# Patient Record
Sex: MALE | Race: BLACK OR AFRICAN AMERICAN | NOT HISPANIC OR LATINO | Employment: FULL TIME | URBAN - METROPOLITAN AREA
[De-identification: names, ages, dates, MRNs, and addresses within clinical notes are randomized per-mention and may not be internally consistent; named-entity substitution may affect disease eponyms.]

---

## 2018-09-18 ENCOUNTER — OFFICE VISIT (OUTPATIENT)
Dept: LAB | Facility: HOSPITAL | Age: 38
End: 2018-09-18
Attending: INTERNAL MEDICINE
Payer: COMMERCIAL

## 2018-09-18 ENCOUNTER — TRANSCRIBE ORDERS (OUTPATIENT)
Dept: ADMINISTRATIVE | Facility: HOSPITAL | Age: 38
End: 2018-09-18

## 2018-09-18 DIAGNOSIS — M79.602 LEFT ARM PAIN: ICD-10-CM

## 2018-09-18 DIAGNOSIS — M79.602 LEFT ARM PAIN: Primary | ICD-10-CM

## 2018-09-18 LAB
ATRIAL RATE: 53 BPM
P AXIS: 59 DEGREES
PR INTERVAL: 182 MS
QRS AXIS: 41 DEGREES
QRSD INTERVAL: 100 MS
QT INTERVAL: 416 MS
QTC INTERVAL: 390 MS
T WAVE AXIS: 21 DEGREES
VENTRICULAR RATE: 53 BPM

## 2018-09-18 PROCEDURE — 93010 ELECTROCARDIOGRAM REPORT: CPT | Performed by: INTERNAL MEDICINE

## 2018-09-18 PROCEDURE — 93005 ELECTROCARDIOGRAM TRACING: CPT

## 2019-01-14 ENCOUNTER — OFFICE VISIT (OUTPATIENT)
Dept: DERMATOLOGY | Facility: CLINIC | Age: 39
End: 2019-01-14
Payer: COMMERCIAL

## 2019-01-14 DIAGNOSIS — Z13.89 SCREENING FOR SKIN CONDITION: ICD-10-CM

## 2019-01-14 DIAGNOSIS — L80 VITILIGO: Primary | ICD-10-CM

## 2019-01-14 PROCEDURE — 99203 OFFICE O/P NEW LOW 30 MIN: CPT | Performed by: DERMATOLOGY

## 2019-01-14 RX ORDER — MELATONIN
1000 DAILY
COMMUNITY

## 2019-01-14 RX ORDER — TACROLIMUS 1 MG/G
OINTMENT TOPICAL 2 TIMES DAILY
Qty: 30 G | Refills: 1 | Status: SHIPPED | OUTPATIENT
Start: 2019-01-14 | End: 2021-09-09 | Stop reason: ALTCHOICE

## 2019-01-14 RX ORDER — DIPHENOXYLATE HYDROCHLORIDE AND ATROPINE SULFATE 2.5; .025 MG/1; MG/1
1 TABLET ORAL DAILY
COMMUNITY

## 2019-01-14 NOTE — PROGRESS NOTES
[address]  [address]  [place]  [phone]  [phone]     MRN: [id number] : 1980  Encounter: [id number]  Patient Information: Medardo Krishnan  Chief complaint:  Discoloration of lips    History of present illness:  52-year-old male presents for concerns regarding loss of pigmentation on his lip that has been going on for 6 months no previous history of loss pigmentation noted  No past medical history on file  No past surgical history on file  Social History   History   Alcohol Use No     History   Drug Use No     History   Smoking Status    Never Smoker   Smokeless Tobacco    Never Used     No family history on file  Meds/Allergies   No Known Allergies    Meds:  Prior to Admission medications    Medication Sig Start Date End Date Taking?  Authorizing Provider   cholecalciferol (VITAMIN D3) 1,000 units tablet Take 1,000 Units by mouth daily   Yes Historical Provider, MD   multivitamin (THERAGRAN) TABS Take 1 tablet by mouth daily   Yes Historical Provider, MD       Subjective:     Review of Systems:    General: negative for - chills, fatigue, fever,  weight gain or weight loss  Psychological: negative for - anxiety, behavioral disorder, concentration difficulties, decreased libido, depression, irritability, memory difficulties, mood swings, sleep disturbances or suicidal ideation  ENT: negative for - hearing difficulties , nasal congestion, nasal discharge, oral lesions, sinus pain, sneezing, sore throat  Allergy and Immunology: negative for - hives, insect bite sensitivity,  Hematological and Lymphatic: negative for - bleeding problems, blood clots,bruising, swollen lymph nodes  Endocrine: negative for - hair pattern changes, hot flashes, malaise/lethargy, mood swings, palpitations, polydipsia/polyuria, skin changes, temperature intolerance or unexpected weight change  Respiratory: negative for - cough, hemoptysis, orthopnea, shortness of breath, or wheezing  Cardiovascular: negative for - chest pain, dyspnea on exertion, edema,  Gastrointestinal: negative for - abdominal pain, nausea/vomiting  Genito-Urinary: negative for - dysuria, incontinence, irregular/heavy menses or urinary frequency/urgency  Musculoskeletal: negative for - gait disturbance, joint pain, joint stiffness, joint swelling, muscle pain, muscular weakness  Dermatological:  As in HPI  Neurological: negative for confusion, dizziness, headaches, impaired coordination/balance, memory loss, numbness/tingling, seizures, speech problems, tremors or weakness       Objective: There were no vitals taken for this visit  Physical Exam:    General Appearance:    Alert, cooperative, no distress   Head:    Normocephalic, without obvious abnormality, atraumatic           Skin:   A full skin exam was performed including scalp, head scalp, eyes, ears, nose, lips, neck, chest, axilla, abdomen, back, buttocks, bilateral upper extremities, bilateral lower extremities, hands, feet, fingers, toes, fingernails, and toenails hypopigmentation the lips noted no other evidence of any of the loss of pigmentation noted on the entire body           Assessment:     1  Vitiligo     2  Screening for skin condition           Plan:   Vitiligo we discussed the concept of this autoimmune process will go ahead and treat with tacrolimus to see if we can get the pigmentation to return  We also hopefully no new spots well-developed will re-evaluate again in about 4 months  Nothing else of concern noted on exam    Lou Hankins MD  2/74/1643,9:22 AM    Portions of the record may have been created with voice recognition software   Occasional wrong word or "sound a like" substitutions may have occurred due to the inherent limitations of voice recognition software   Read the chart carefully and recognize, using context, where substitutions have occurred

## 2019-01-14 NOTE — PATIENT INSTRUCTIONS
Vitiligo we discussed the concept of this autoimmune process will go ahead and treat with tacrolimus to see if we can get the pigmentation to return    We also hopefully no new spots well-developed will re-evaluate again in about 4 months  Nothing else of concern noted on exam

## 2019-01-16 ENCOUNTER — TELEPHONE (OUTPATIENT)
Dept: DERMATOLOGY | Facility: CLINIC | Age: 39
End: 2019-01-16

## 2021-08-17 ENCOUNTER — HOSPITAL ENCOUNTER (OUTPATIENT)
Dept: RADIOLOGY | Facility: HOSPITAL | Age: 41
Discharge: HOME | End: 2021-08-17
Attending: INTERNAL MEDICINE
Payer: COMMERCIAL

## 2021-08-17 DIAGNOSIS — M79.672 LEFT FOOT PAIN: ICD-10-CM

## 2021-08-17 PROCEDURE — 73630 X-RAY EXAM OF FOOT: CPT

## 2021-09-09 VITALS
DIASTOLIC BLOOD PRESSURE: 84 MMHG | BODY MASS INDEX: 25.67 KG/M2 | WEIGHT: 200 LBS | HEIGHT: 74 IN | HEART RATE: 64 BPM | RESPIRATION RATE: 18 BRPM | SYSTOLIC BLOOD PRESSURE: 131 MMHG

## 2021-09-09 DIAGNOSIS — R29.898 HYPEREXTENSION OF GREAT TOE: Primary | ICD-10-CM

## 2021-09-09 PROCEDURE — 99203 OFFICE O/P NEW LOW 30 MIN: CPT | Performed by: ORTHOPAEDIC SURGERY

## 2021-09-09 NOTE — PROGRESS NOTES
Patient Name:  Boston Thompson  MRN:  [id number]    [address] [place] [address] [place]     1  Hyperextension of great toe  -     Durable Medical Equipment      37year old male with Left foot hyperextension injury, possible plantar plate injury with most pain and tenderness over 1st MTP joint  X-rays reviewed in office today with patient  In light of patients traumatic foot pain and negtive x-rays, advised we can perform weightbearing x-rays; patient denied at this time as he received a bill in the mail from last images performed  Placed patient in post op, hard sole shoe to prevent dorsiflexion of toes  Advised patient to apply ice and perform gentle ROM exercises of Left ankle and toes  Can continue OTC oral analgesics as needed for pain relief  Continue use of hard sole shoe for 4 weeks until follow up in office in 4 weeks  Verbalized understanding of the above  Chief Complaint     Left foot pain    History of the Present Illness     Boston Thompson is a 37 y o  male with Left foot pain for one month after injury sliding during a baseball game  Patient reports he jammed Left foot into ground before base, had immediate pain in Left foot, but was able to ambulate and play two more innings of the game  Patient did seek out medical care with PCP whom ordered x-rays which were interpreted with no acute osseous abnormality  He reports he has been icing and elevating and administering OTC oral analgesics as needed for pain without resolution  He reports pain with toe extension and tight shoes  Patient  of a band and reports pain and difficulty with consistent ambulation throughout the day  Denies numbness or tingling Left lower extremity  Review of Systems     Review of Systems   Constitutional: Negative for chills and fever  HENT: Negative for ear pain and sore throat  Eyes: Negative for pain and visual disturbance  Respiratory: Negative for cough and shortness of breath      Cardiovascular: Negative for chest pain and palpitations  Gastrointestinal: Negative for abdominal pain and vomiting  Genitourinary: Negative for dysuria and hematuria  Musculoskeletal: Negative for arthralgias, back pain and gait problem  Skin: Negative for color change and rash  Neurological: Negative for seizures and syncope  All other systems reviewed and are negative  Physical Exam     /84   Pulse 64   Resp 18   Ht 6' 2" (1 88 m)   Wt 90 7 kg (200 lb)   BMI 25 68 kg/m²     Left foot: Minimal swelling noted over dorsum of 2nd-4th MTP toes without erythema or rash  Tenderness to palpation over MTP joints of 1st-4th toes and along dorsum of Left foot; greatest discomfort at dorsum and medial aspect of 1st MTP joint  Tenderness over plantar aspect of 1st MTP joint appreciated  Tenderness with passive and active extension of 1st through 4th toes  No tenderness over plantar aspect of foot, no tenderness at Lisfranc joint  No pain with midfoot compression  Full active range of motion of ankle without pain  Sensation intact L2-S1 dermatomes  +2 DP pulse palpated  Eyes:  Anicteric sclerae  Neck:  Supple  Lungs:  Normal respiratory effort  Cardiovascular:  Capillary refill is less than 2 seconds  Skin:  Intact without erythema  Neurologic:  Sensation grossly intact to light touch  Psychiatric:  Mood and affect are appropriate  Data Review     I have personally reviewed pertinent films in PACS, and my interpretation follows:    X-rays taken of Left foot demonstrate no acute fracture, dislocation, significant degenerative change  History reviewed  No pertinent past medical history  History reviewed  No pertinent surgical history      No Known Allergies    Current Outpatient Medications on File Prior to Visit   Medication Sig Dispense Refill    cholecalciferol (VITAMIN D3) 1,000 units tablet Take 1,000 Units by mouth daily      multivitamin (THERAGRAN) TABS Take 1 tablet by mouth daily      [DISCONTINUED] tacrolimus (PROTOPIC) 0 1 % ointment Apply topically 2 (two) times a day (Patient not taking: Reported on 9/9/2021) 30 g 1     No current facility-administered medications on file prior to visit         Social History     Tobacco Use    Smoking status: Never Smoker    Smokeless tobacco: Never Used   Vaping Use    Vaping Use: Never used   Substance Use Topics    Alcohol use: No    Drug use: No       Family History   Problem Relation Age of Onset    No Known Problems Mother     No Known Problems Father              Procedures Performed     Procedures  None      Elif Hinds DO

## 2023-06-21 ENCOUNTER — APPOINTMENT (EMERGENCY)
Dept: RADIOLOGY | Facility: HOSPITAL | Age: 43
End: 2023-06-21
Payer: COMMERCIAL

## 2023-06-21 ENCOUNTER — HOSPITAL ENCOUNTER (EMERGENCY)
Facility: HOSPITAL | Age: 43
Discharge: HOME | End: 2023-06-21
Attending: EMERGENCY MEDICINE
Payer: COMMERCIAL

## 2023-06-21 VITALS
RESPIRATION RATE: 20 BRPM | SYSTOLIC BLOOD PRESSURE: 140 MMHG | OXYGEN SATURATION: 100 % | WEIGHT: 209.22 LBS | TEMPERATURE: 97.7 F | BODY MASS INDEX: 26.85 KG/M2 | HEART RATE: 58 BPM | HEIGHT: 74 IN | DIASTOLIC BLOOD PRESSURE: 92 MMHG

## 2023-06-21 DIAGNOSIS — R07.9 CHEST PAIN: Primary | ICD-10-CM

## 2023-06-21 LAB
ALBUMIN SERPL BCP-MCNC: 4.6 G/DL (ref 3.5–5)
ALP SERPL-CCNC: 49 U/L (ref 34–104)
ALT SERPL W P-5'-P-CCNC: 32 U/L (ref 7–52)
ANION GAP SERPL CALCULATED.3IONS-SCNC: 4 MMOL/L
AST SERPL W P-5'-P-CCNC: 22 U/L (ref 13–39)
ATRIAL RATE: 54 BPM
BASOPHILS # BLD AUTO: 0.02 THOUSANDS/ÂΜL (ref 0–0.1)
BASOPHILS NFR BLD AUTO: 1 % (ref 0–1)
BILIRUB SERPL-MCNC: 1.04 MG/DL (ref 0.2–1)
BUN SERPL-MCNC: 17 MG/DL (ref 5–25)
CALCIUM SERPL-MCNC: 9.3 MG/DL (ref 8.4–10.2)
CARDIAC TROPONIN I PNL SERPL HS: 3 NG/L
CHLORIDE SERPL-SCNC: 107 MMOL/L (ref 96–108)
CO2 SERPL-SCNC: 30 MMOL/L (ref 21–32)
CREAT SERPL-MCNC: 0.83 MG/DL (ref 0.6–1.3)
EOSINOPHIL # BLD AUTO: 0.17 THOUSAND/ÂΜL (ref 0–0.61)
EOSINOPHIL NFR BLD AUTO: 5 % (ref 0–6)
ERYTHROCYTE [DISTWIDTH] IN BLOOD BY AUTOMATED COUNT: 12.4 % (ref 11.6–15.1)
GFR SERPL CREATININE-BSD FRML MDRD: 108 ML/MIN/1.73SQ M
GLUCOSE SERPL-MCNC: 86 MG/DL (ref 65–140)
HCT VFR BLD AUTO: 44.7 % (ref 36.5–49.3)
HGB BLD-MCNC: 14.1 G/DL (ref 12–17)
IMM GRANULOCYTES # BLD AUTO: 0 THOUSAND/UL (ref 0–0.2)
IMM GRANULOCYTES NFR BLD AUTO: 0 % (ref 0–2)
LYMPHOCYTES # BLD AUTO: 1.44 THOUSANDS/ÂΜL (ref 0.6–4.47)
LYMPHOCYTES NFR BLD AUTO: 43 % (ref 14–44)
MCH RBC QN AUTO: 29.3 PG (ref 26.8–34.3)
MCHC RBC AUTO-ENTMCNC: 31.5 G/DL (ref 31.4–37.4)
MCV RBC AUTO: 93 FL (ref 82–98)
MONOCYTES # BLD AUTO: 0.3 THOUSAND/ÂΜL (ref 0.17–1.22)
MONOCYTES NFR BLD AUTO: 9 % (ref 4–12)
NEUTROPHILS # BLD AUTO: 1.46 THOUSANDS/ÂΜL (ref 1.85–7.62)
NEUTS SEG NFR BLD AUTO: 42 % (ref 43–75)
NRBC BLD AUTO-RTO: 0 /100 WBCS
P AXIS: 49 DEGREES
PLATELET # BLD AUTO: 190 THOUSANDS/UL (ref 149–390)
PMV BLD AUTO: 10.9 FL (ref 8.9–12.7)
POTASSIUM SERPL-SCNC: 4.1 MMOL/L (ref 3.5–5.3)
PR INTERVAL: 176 MS
PROT SERPL-MCNC: 7.6 G/DL (ref 6.4–8.4)
QRS AXIS: 44 DEGREES
QRSD INTERVAL: 102 MS
QT INTERVAL: 422 MS
QTC INTERVAL: 400 MS
RBC # BLD AUTO: 4.82 MILLION/UL (ref 3.88–5.62)
SODIUM SERPL-SCNC: 141 MMOL/L (ref 135–147)
T WAVE AXIS: 17 DEGREES
VENTRICULAR RATE: 54 BPM
WBC # BLD AUTO: 3.39 THOUSAND/UL (ref 4.31–10.16)

## 2023-06-21 PROCEDURE — 93005 ELECTROCARDIOGRAM TRACING: CPT

## 2023-06-21 PROCEDURE — 93010 ELECTROCARDIOGRAM REPORT: CPT | Performed by: INTERNAL MEDICINE

## 2023-06-21 PROCEDURE — 80053 COMPREHEN METABOLIC PANEL: CPT | Performed by: PHYSICIAN ASSISTANT

## 2023-06-21 PROCEDURE — 36415 COLL VENOUS BLD VENIPUNCTURE: CPT | Performed by: PHYSICIAN ASSISTANT

## 2023-06-21 PROCEDURE — 84484 ASSAY OF TROPONIN QUANT: CPT | Performed by: PHYSICIAN ASSISTANT

## 2023-06-21 PROCEDURE — 85025 COMPLETE CBC W/AUTO DIFF WBC: CPT | Performed by: PHYSICIAN ASSISTANT

## 2023-06-21 PROCEDURE — 71046 X-RAY EXAM CHEST 2 VIEWS: CPT

## 2023-06-21 NOTE — ED PROVIDER NOTES
History  Chief Complaint   Patient presents with   • Chest Pain     Pt reports sharp anterior CP that is intermittent  Usually at rest and when he is lying to right or left  Reports shoulder pain today     36yo male with no significant past medical history presenting for evaluation of chest pain x 2-3 weeks  He reports localized pain in the right upper and left lower chest for the past several weeks  He describes the pain as sharp and rates his pain as a 7/10 in severity  He initially thought he may have pulled a muscle at [employer] but he denies any direct trauma to the chest  He denies any alleviating or provoking factors  Specifically, he denies any pleuritic, postprandial, or exertional pain  He has tried ibuprofen with some improvement  He is otherwise asymptomatic and denies any fevers, chills, cough, dizziness, syncope, nausea, vomiting, abdominal pain, shortness of breath, paresthesias, leg swelling  No personal or family history of heart disease  He is a non-smoker  History provided by:  Patient   used: No    Chest Pain  Pain location:  R chest and L chest  Pain quality: sharp    Pain radiates to:  Does not radiate  Pain severity:  Moderate  Onset quality:  Gradual  Duration:  3 weeks  Timing:  Constant  Progression:  Unchanged  Chronicity:  New  Relieved by:  Nothing  Worsened by:  Nothing tried  Associated symptoms: no abdominal pain, no altered mental status, no cough, no dizziness, no fever, no lower extremity edema, no nausea, no near-syncope, no numbness, no shortness of breath, no syncope and not vomiting        Prior to Admission Medications   Prescriptions Last Dose Informant Patient Reported? Taking? cholecalciferol (VITAMIN D3) 1,000 units tablet  Self Yes No   Sig: Take 1,000 Units by mouth daily   multivitamin (THERAGRAN) TABS  Self Yes No   Sig: Take 1 tablet by mouth daily      Facility-Administered Medications: None       History reviewed   No pertinent past medical history  History reviewed  No pertinent surgical history  Family History   Problem Relation Age of Onset   • No Known Problems Mother    • No Known Problems Father      I have reviewed and agree with the history as documented  E-Cigarette/Vaping   • E-Cigarette Use Never User      E-Cigarette/Vaping Substances   • Nicotine No    • THC No    • CBD No    • Flavoring No    • Other No    • Unknown No      Social History     Tobacco Use   • Smoking status: Never   • Smokeless tobacco: Never   Vaping Use   • Vaping Use: Never used   Substance Use Topics   • Alcohol use: Yes     Comment: socially   • Drug use: No       Review of Systems   Constitutional: Negative for chills and fever  HENT: Negative for drooling and voice change  Eyes: Negative for discharge and redness  Respiratory: Negative for cough, shortness of breath and stridor  Cardiovascular: Positive for chest pain  Negative for leg swelling, syncope and near-syncope  Gastrointestinal: Negative for abdominal pain, nausea and vomiting  Musculoskeletal: Negative for neck pain and neck stiffness  Skin: Negative for color change and rash  Neurological: Negative for dizziness, seizures, syncope and numbness  Psychiatric/Behavioral: Negative for confusion  The patient is not nervous/anxious  All other systems reviewed and are negative  Physical Exam  Physical Exam  Vitals and nursing note reviewed  Constitutional:       General: He is not in acute distress  Appearance: He is well-developed  He is not diaphoretic  HENT:      Head: Normocephalic and atraumatic  Right Ear: External ear normal       Left Ear: External ear normal    Eyes:      General: No scleral icterus  Right eye: No discharge  Left eye: No discharge  Conjunctiva/sclera: Conjunctivae normal    Cardiovascular:      Rate and Rhythm: Normal rate and regular rhythm  Heart sounds: Normal heart sounds   No murmur heard   Pulmonary:      Effort: Pulmonary effort is normal  No respiratory distress  Breath sounds: Normal breath sounds  No stridor  No wheezing or rales  Abdominal:      General: Bowel sounds are normal  There is no distension  Palpations: Abdomen is soft  Tenderness: There is no abdominal tenderness  There is no guarding  Musculoskeletal:         General: No deformity  Normal range of motion  Cervical back: Normal range of motion and neck supple  Right lower leg: No edema  Left lower leg: No edema  Skin:     General: Skin is warm and dry  Neurological:      Mental Status: He is alert  He is not disoriented  GCS: GCS eye subscore is 4  GCS verbal subscore is 5  GCS motor subscore is 6     Psychiatric:         Behavior: Behavior normal          Vital Signs  ED Triage Vitals [06/21/23 0955]   Temperature Pulse Respirations Blood Pressure SpO2   97 7 °F (36 5 °C) 59 18 159/91 99 %      Temp src Heart Rate Source Patient Position - Orthostatic VS BP Location FiO2 (%)   -- Monitor Lying Right arm --      Pain Score       7           Vitals:    06/21/23 0955 06/21/23 1000 06/21/23 1100   BP: 159/91 134/90 140/92   Pulse: 59 65 58   Patient Position - Orthostatic VS: Lying Lying          Visual Acuity      ED Medications  Medications - No data to display    Diagnostic Studies  Results Reviewed     Procedure Component Value Units Date/Time    Comprehensive metabolic panel [[id number]]  (Abnormal) Collected: 06/21/23 1007    Lab Status: Final result Specimen: Blood from Arm, Left Updated: 06/21/23 1040     Sodium 141 mmol/L      Potassium 4 1 mmol/L      Chloride 107 mmol/L      CO2 30 mmol/L      ANION GAP 4 mmol/L      BUN 17 mg/dL      Creatinine 0 83 mg/dL      Glucose 86 mg/dL      Calcium 9 3 mg/dL      AST 22 U/L      ALT 32 U/L      Alkaline Phosphatase 49 U/L      Total Protein 7 6 g/dL      Albumin 4 6 g/dL      Total Bilirubin 1 04 mg/dL      eGFR 108 ml/min/1 73sq m Narrative:      National Kidney Disease Foundation guidelines for Chronic Kidney Disease (CKD):   •  Stage 1 with normal or high GFR (GFR > 90 mL/min/1 73 square meters)  •  Stage 2 Mild CKD (GFR = 60-89 mL/min/1 73 square meters)  •  Stage 3A Moderate CKD (GFR = 45-59 mL/min/1 73 square meters)  •  Stage 3B Moderate CKD (GFR = 30-44 mL/min/1 73 square meters)  •  Stage 4 Severe CKD (GFR = 15-29 mL/min/1 73 square meters)  •  Stage 5 End Stage CKD (GFR <15 mL/min/1 73 square meters)  Note: GFR calculation is accurate only with a steady state creatinine    HS Troponin 0hr (reflex protocol) [[id number]]  (Normal) Collected: 06/21/23 1007    Lab Status: Final result Specimen: Blood from Arm, Left Updated: 06/21/23 1039     hs TnI 0hr 3 ng/L     CBC and differential [[id number]]  (Abnormal) Collected: 06/21/23 1007    Lab Status: Final result Specimen: Blood from Arm, Left Updated: 06/21/23 1014     WBC 3 39 Thousand/uL      RBC 4 82 Million/uL      Hemoglobin 14 1 g/dL      Hematocrit 44 7 %      MCV 93 fL      MCH 29 3 pg      MCHC 31 5 g/dL      RDW 12 4 %      MPV 10 9 fL      Platelets 924 Thousands/uL      nRBC 0 /100 WBCs      Neutrophils Relative 42 %      Immat GRANS % 0 %      Lymphocytes Relative 43 %      Monocytes Relative 9 %      Eosinophils Relative 5 %      Basophils Relative 1 %      Neutrophils Absolute 1 46 Thousands/µL      Immature Grans Absolute 0 00 Thousand/uL      Lymphocytes Absolute 1 44 Thousands/µL      Monocytes Absolute 0 30 Thousand/µL      Eosinophils Absolute 0 17 Thousand/µL      Basophils Absolute 0 02 Thousands/µL                  XR chest 2 views   ED Interpretation by Marie Molina PA-C (06/21 1044)   No acute abnormality      Final Result by Sasha Cabello MD (06/21 1209)      No acute cardiopulmonary disease                    Workstation performed: [id number]                    Procedures  ECG 12 Lead Documentation Only    Date/Time: 6/21/2023 10:08 AM    Performed by: Nhan Diehl PA-C  Authorized by: Nhan Diehl PA-C    Indications / Diagnosis:  CP  ECG reviewed by me, the ED Provider: yes    Patient location:  ED  Previous ECG:     Previous ECG:  Compared to current    Comparison ECG info:  9/18/18  Interpretation:     Interpretation: normal    Rate:     ECG rate:  54    ECG rate assessment: bradycardic    Rhythm:     Rhythm: sinus bradycardia    Ectopy:     Ectopy: none    QRS:     QRS axis:  Normal  Conduction:     Conduction: normal    ST segments:     ST segments:  Normal  T waves:     T waves: normal               ED Course             HEART Risk Score    Flowsheet Row Most Recent Value   Heart Score Risk Calculator    History 0 Filed at: 06/21/2023 1045   ECG 0 Filed at: 06/21/2023 1045   Age 0 Filed at: 06/21/2023 1045   Risk Factors 0 Filed at: 06/21/2023 1045   Troponin 0 Filed at: 06/21/2023 1045   HEART Score 0 Filed at: 06/21/2023 1045                        SBIRT 20yo+    Flowsheet Row Most Recent Value   Initial Alcohol Screen: US AUDIT-C     1  How often do you have a drink containing alcohol? 2 Filed at: 06/21/2023 0958   2  How many drinks containing alcohol do you have on a typical day you are drinking? 1 Filed at: 06/21/2023 0958   3a  Male UNDER 65: How often do you have five or more drinks on one occasion? 0 Filed at: 06/21/2023 0958   Audit-C Score 3 Filed at: 06/21/2023 8841   HAN: How many times in the past year have you    Used an illegal drug or used a prescription medication for non-medical reasons? Never Filed at: 06/21/2023 [address] Making  43yoM presenting for chest pain x 2-3 weeks  Constant sharp pain  Not exertional or pleuritic  Otherwise asymptomatic  No known cardiac risk factors  He is afebrile and hemodynamically stable  He is well appearing in no distress  Exam is reassuring  Initial ED plan: Check cardiac labs, EKG, and CXR      Final assessment: Labs reveal a mildly low white count which patient states is chronic for him  Remainder of labs unremarkable  EKG reveals sinus bradycardia and high sensitivity troponin is normal  CXR is clear  HEART score is 0  No indication for admission  Advised close PCP follow-up  Strict ED return precautions discussed  Patient expressed understanding and is agreeable to plan  Patient discharged in stable condition  Chest pain: acute illness or injury  Amount and/or Complexity of Data Reviewed  Labs: ordered  Radiology: ordered and independent interpretation performed  ECG/medicine tests: ordered and independent interpretation performed  Disposition  Final diagnoses:   Chest pain     Time reflects when diagnosis was documented in both MDM as applicable and the Disposition within this note     Time User Action Codes Description Comment    6/21/2023 10:53 AM Jalil Ruben Add [R07 9] Chest pain       ED Disposition     ED Disposition   Discharge    Condition   Stable    Date/Time   Wed Jun 21, 2023 10:53 AM    Comment   Mango Li discharge to home/self care  Follow-up Information     Follow up With Specialties Details Why Contact Info Additional Information    Amilcar Gregory MD Internal Medicine Schedule an appointment as soon as possible for a visit   [address] [facility] [place] [address] [place] [address] Emergency Department Emergency Medicine  If symptoms worsen [facility] Emergency Department, [address] [place] [address], [id number]          Discharge Medication List as of 6/21/2023 10:54 AM      CONTINUE these medications which have NOT CHANGED    Details   cholecalciferol (VITAMIN D3) 1,000 units tablet Take 1,000 Units by mouth daily, Historical Med      multivitamin (THERAGRAN) TABS Take 1 tablet by mouth daily, Historical Med             No discharge procedures on file      PDMP Review     None          ED Provider  Electronically Signed by           Mg Curran PA-C  06/21/23 0373

## 2023-06-21 NOTE — DISCHARGE INSTRUCTIONS
Please follow-up closely with your family doctor  Return to the ER with any new or worsening symptoms